# Patient Record
Sex: MALE | Race: BLACK OR AFRICAN AMERICAN | NOT HISPANIC OR LATINO | Employment: FULL TIME | URBAN - METROPOLITAN AREA
[De-identification: names, ages, dates, MRNs, and addresses within clinical notes are randomized per-mention and may not be internally consistent; named-entity substitution may affect disease eponyms.]

---

## 2018-04-28 ENCOUNTER — HOSPITAL ENCOUNTER (EMERGENCY)
Facility: HOSPITAL | Age: 38
Discharge: HOME | End: 2018-04-29
Attending: EMERGENCY MEDICINE

## 2018-04-28 DIAGNOSIS — R10.13 EPIGASTRIC PAIN: Primary | ICD-10-CM

## 2018-04-28 PROCEDURE — 99283 EMERGENCY DEPT VISIT LOW MDM: CPT

## 2018-04-29 VITALS
TEMPERATURE: 98 F | OXYGEN SATURATION: 96 % | SYSTOLIC BLOOD PRESSURE: 123 MMHG | WEIGHT: 220 LBS | BODY MASS INDEX: 32.58 KG/M2 | HEART RATE: 70 BPM | DIASTOLIC BLOOD PRESSURE: 80 MMHG | RESPIRATION RATE: 16 BRPM | HEIGHT: 69 IN

## 2018-04-29 PROCEDURE — 25000003 PHARM REV CODE 250: Performed by: EMERGENCY MEDICINE

## 2018-04-29 RX ORDER — ESOMEPRAZOLE MAGNESIUM 40 MG/1
40 CAPSULE, DELAYED RELEASE ORAL DAILY
Qty: 30 CAPSULE | Refills: 0 | Status: CANCELLED | OUTPATIENT
Start: 2018-04-29 | End: 2019-04-29

## 2018-04-29 RX ADMIN — LIDOCAINE HYDROCHLORIDE: 20 SOLUTION ORAL; TOPICAL at 12:04

## 2018-04-29 NOTE — ED PROVIDER NOTES
"Encounter Date: 4/28/2018       History     Chief Complaint   Patient presents with    Abdominal Pain     epigastric pain "a lot of discomfort" 7/10, unable to describe+ nausea and gas, pt reports recent hx of constipation this week and took a laxitive on Wednesday      Patient was emergently or with 3 days of epigastric burning.  Pain is intermittent and usually associated with food intake.  Patient denies fever chest pain diaphoresis or nausea.  Pain is moderate to severe and described as burning patient's reports some improvement when he takes Mylanta at home.          Review of patient's allergies indicates:  No Known Allergies  History reviewed. No pertinent past medical history.  History reviewed. No pertinent surgical history.  History reviewed. No pertinent family history.  Social History   Substance Use Topics    Smoking status: Never Smoker    Smokeless tobacco: Never Used    Alcohol use Yes     Review of Systems   Constitutional: Negative for fever.   HENT: Negative for sore throat.    Respiratory: Negative for cough, choking, chest tightness and shortness of breath.    Cardiovascular: Negative for chest pain and palpitations.   Gastrointestinal: Positive for abdominal pain. Negative for nausea and vomiting.   Genitourinary: Negative for dysuria.   Musculoskeletal: Negative for back pain.   Skin: Negative for rash.   Neurological: Negative for weakness.   Hematological: Does not bruise/bleed easily.   All other systems reviewed and are negative.      Physical Exam     Initial Vitals [04/28/18 2309]   BP Pulse Resp Temp SpO2   (!) 145/91 73 16 98.4 °F (36.9 °C) 99 %      MAP       109         Physical Exam    Nursing note and vitals reviewed.  Constitutional: Vital signs are normal. He appears well-developed and well-nourished. No distress.   HENT:   Head: Normocephalic and atraumatic.   Eyes: EOM are normal. Pupils are equal, round, and reactive to light.   Neck: Normal range of motion. Neck supple. "   Cardiovascular: Normal rate and regular rhythm.   Pulmonary/Chest: Breath sounds normal. No respiratory distress. He has no wheezes. He has no rhonchi. He has no rales. He exhibits no tenderness.   Abdominal: Soft. He exhibits no distension. There is no tenderness. There is no rebound and no guarding.   No rebound or guarding all 4 quadrants.   Musculoskeletal: Normal range of motion. He exhibits no tenderness.   Neurological: He is alert and oriented to person, place, and time. No cranial nerve deficit.   Skin: Skin is warm and dry.   Psychiatric: He has a normal mood and affect.         ED Course   Procedures  Labs Reviewed - No data to display                       Attending Attestation:             Attending ED Notes:   Pain completely resolved after GI cocktail.             Clinical Impression:   The encounter diagnosis was Epigastric pain.    Disposition:   Disposition: Discharged  Condition: Stable                        Fred Blake MD  04/29/18 0044       Fred Blake MD  04/29/18 0044

## 2019-02-16 ENCOUNTER — HOSPITAL ENCOUNTER (EMERGENCY)
Facility: HOSPITAL | Age: 39
Discharge: HOME | End: 2019-02-16
Attending: FAMILY MEDICINE
Payer: COMMERCIAL

## 2019-02-16 VITALS
DIASTOLIC BLOOD PRESSURE: 70 MMHG | OXYGEN SATURATION: 99 % | SYSTOLIC BLOOD PRESSURE: 120 MMHG | RESPIRATION RATE: 17 BRPM | HEART RATE: 76 BPM | TEMPERATURE: 99 F

## 2019-02-16 DIAGNOSIS — B34.9 ACUTE VIRAL SYNDROME: Primary | ICD-10-CM

## 2019-02-16 LAB
ALBUMIN SERPL BCP-MCNC: 4.2 G/DL
ALP SERPL-CCNC: 68 U/L
ALT SERPL W/O P-5'-P-CCNC: 17 U/L
ANION GAP SERPL CALC-SCNC: 7 MMOL/L
AST SERPL-CCNC: 43 U/L
BASOPHILS # BLD AUTO: 0.01 K/UL
BASOPHILS NFR BLD: 0.2 %
BILIRUB SERPL-MCNC: 1.5 MG/DL
BUN SERPL-MCNC: 11 MG/DL
CALCIUM SERPL-MCNC: 8.8 MG/DL
CHLORIDE SERPL-SCNC: 105 MMOL/L
CO2 SERPL-SCNC: 27 MMOL/L
CREAT SERPL-MCNC: 1 MG/DL
DIFFERENTIAL METHOD: ABNORMAL
EOSINOPHIL # BLD AUTO: 0 K/UL
EOSINOPHIL NFR BLD: 0.6 %
ERYTHROCYTE [DISTWIDTH] IN BLOOD BY AUTOMATED COUNT: 14 %
EST. GFR  (AFRICAN AMERICAN): >60 ML/MIN/1.73 M^2
EST. GFR  (NON AFRICAN AMERICAN): >60 ML/MIN/1.73 M^2
GLUCOSE SERPL-MCNC: 98 MG/DL
HCT VFR BLD AUTO: 42.7 %
HGB BLD-MCNC: 13.8 G/DL
INFLUENZA A, MOLECULAR: NEGATIVE
INFLUENZA B, MOLECULAR: NEGATIVE
LYMPHOCYTES # BLD AUTO: 0.4 K/UL
LYMPHOCYTES NFR BLD: 8.2 %
MCH RBC QN AUTO: 28.3 PG
MCHC RBC AUTO-ENTMCNC: 32.3 G/DL
MCV RBC AUTO: 88 FL
MONOCYTES # BLD AUTO: 0.4 K/UL
MONOCYTES NFR BLD: 8.4 %
NEUTROPHILS # BLD AUTO: 4.3 K/UL
NEUTROPHILS NFR BLD: 82.6 %
PLATELET # BLD AUTO: 246 K/UL
PMV BLD AUTO: 9.4 FL
POTASSIUM SERPL-SCNC: 4.2 MMOL/L
PROT SERPL-MCNC: 7.3 G/DL
RBC # BLD AUTO: 4.87 M/UL
SODIUM SERPL-SCNC: 139 MMOL/L
SPECIMEN SOURCE: NORMAL
WBC # BLD AUTO: 5.24 K/UL

## 2019-02-16 PROCEDURE — 96360 HYDRATION IV INFUSION INIT: CPT | Mod: ER

## 2019-02-16 PROCEDURE — 80053 COMPREHEN METABOLIC PANEL: CPT | Mod: ER

## 2019-02-16 PROCEDURE — 87502 INFLUENZA DNA AMP PROBE: CPT | Mod: ER

## 2019-02-16 PROCEDURE — 25000003 PHARM REV CODE 250: Mod: ER | Performed by: PHYSICIAN ASSISTANT

## 2019-02-16 PROCEDURE — 85025 COMPLETE CBC W/AUTO DIFF WBC: CPT | Mod: ER

## 2019-02-16 PROCEDURE — 99283 EMERGENCY DEPT VISIT LOW MDM: CPT | Mod: 25,ER

## 2019-02-16 RX ORDER — DICYCLOMINE HYDROCHLORIDE 20 MG/1
20 TABLET ORAL 3 TIMES DAILY PRN
Qty: 21 TABLET | Refills: 0 | Status: SHIPPED | OUTPATIENT
Start: 2019-02-16 | End: 2019-03-12

## 2019-02-16 RX ORDER — DICYCLOMINE HYDROCHLORIDE 10 MG/1
20 CAPSULE ORAL
Status: COMPLETED | OUTPATIENT
Start: 2019-02-16 | End: 2019-02-16

## 2019-02-16 RX ADMIN — DICYCLOMINE HYDROCHLORIDE 20 MG: 10 CAPSULE ORAL at 12:02

## 2019-02-16 RX ADMIN — SODIUM CHLORIDE 1000 ML: 0.9 INJECTION, SOLUTION INTRAVENOUS at 12:02

## 2019-02-16 NOTE — ED PROVIDER NOTES
Encounter Date: 2/16/2019       History     Chief Complaint   Patient presents with    Diarrhea     Diarrhea since yesterday afternoon     Patient is a 38-year-old male presenting with complaint of many episodes of diarrhea since yesterday.  He is feeling generally weak now.  No melena or hematochezia.  No recent antibiotics, travel or known exposure to a diarrhea illness.  His son is sick with the flu right now.  He denies any cough, congestion, fever or chills.  No abdominal pain.          Review of patient's allergies indicates:  No Known Allergies  History reviewed. No pertinent past medical history.  Past Surgical History:   Procedure Laterality Date    CLEFT PALATE REPAIR       History reviewed. No pertinent family history.  Social History     Tobacco Use    Smoking status: Never Smoker    Smokeless tobacco: Never Used   Substance Use Topics    Alcohol use: Yes    Drug use: Yes     Review of Systems   Constitutional: Positive for appetite change and fatigue. Negative for activity change, chills and fever.   HENT: Negative for congestion, ear pain and sore throat.    Respiratory: Negative for cough, shortness of breath and wheezing.    Cardiovascular: Negative for chest pain, palpitations and leg swelling.   Gastrointestinal: Positive for diarrhea. Negative for abdominal pain, blood in stool, nausea and vomiting.   Genitourinary: Positive for decreased urine volume. Negative for dysuria, flank pain and hematuria.   Musculoskeletal: Negative for back pain, myalgias, neck pain and neck stiffness.   Skin: Negative for rash.   Neurological: Negative for dizziness and headaches.   All other systems reviewed and are negative.      Physical Exam     Initial Vitals [02/16/19 1157]   BP Pulse Resp Temp SpO2   121/77 80 15 98.9 °F (37.2 °C) 100 %      MAP       --         Physical Exam    Nursing note and vitals reviewed.  Constitutional: He appears well-developed and well-nourished. He appears distressed  (Malaise).   HENT:   Head: Normocephalic and atraumatic.   Nose: Nose normal.   Mouth/Throat: Oropharynx is clear and moist.   Eyes: Conjunctivae and EOM are normal. Pupils are equal, round, and reactive to light.   Neck: Normal range of motion. Neck supple.   Cardiovascular: Normal rate, regular rhythm, normal heart sounds and intact distal pulses.   Pulmonary/Chest: Breath sounds normal. No respiratory distress. He has no wheezes. He has no rhonchi. He has no rales.   Abdominal: Soft. Bowel sounds are normal. There is no tenderness. There is no rebound and no guarding.   No tenderness over McBurney's point   Musculoskeletal: He exhibits no edema.   Lymphadenopathy:     He has no cervical adenopathy.   Neurological: He is alert and oriented to person, place, and time.   Skin: Skin is warm and dry. No rash noted.   Psychiatric: He has a normal mood and affect. His behavior is normal. Judgment and thought content normal.         ED Course   Procedures  Labs Reviewed   INFLUENZA A & B BY MOLECULAR   CBC W/ AUTO DIFFERENTIAL   COMPREHENSIVE METABOLIC PANEL   URINALYSIS, REFLEX TO URINE CULTURE          Imaging Results    None          Medical Decision Making:   Clinical Tests:   Lab Tests: Ordered and Reviewed  The following lab test(s) were unremarkable: CBC and CMP       <> Summary of Lab: Influenza negative.   Patient was feeling better, able to urinate and tolerating p.o. prior to discharge. No evidence of acute surgical abdomen. Advised him on supportive care.  Prescription for Bentyl.  Follow-up with PCP.  Return to the ED if worse in any way                      Clinical Impression:   The encounter diagnosis was Acute viral syndrome.      Disposition:   Disposition: Discharged                        LORAINE Leyva  02/16/19 2514

## 2019-03-12 ENCOUNTER — HOSPITAL ENCOUNTER (EMERGENCY)
Facility: HOSPITAL | Age: 39
Discharge: HOME | End: 2019-03-12
Attending: EMERGENCY MEDICINE
Payer: COMMERCIAL

## 2019-03-12 VITALS
RESPIRATION RATE: 18 BRPM | BODY MASS INDEX: 31.5 KG/M2 | HEART RATE: 77 BPM | SYSTOLIC BLOOD PRESSURE: 128 MMHG | OXYGEN SATURATION: 98 % | DIASTOLIC BLOOD PRESSURE: 89 MMHG | HEIGHT: 70 IN | TEMPERATURE: 98 F | WEIGHT: 220 LBS

## 2019-03-12 DIAGNOSIS — S16.1XXA NECK STRAIN, INITIAL ENCOUNTER: Primary | ICD-10-CM

## 2019-03-12 DIAGNOSIS — S39.012A BACK STRAIN, INITIAL ENCOUNTER: ICD-10-CM

## 2019-03-12 PROCEDURE — 99284 EMERGENCY DEPT VISIT MOD MDM: CPT | Mod: ER

## 2019-03-12 PROCEDURE — 25000003 PHARM REV CODE 250: Mod: ER | Performed by: PHYSICIAN ASSISTANT

## 2019-03-12 RX ORDER — CYCLOBENZAPRINE HCL 10 MG
10 TABLET ORAL 3 TIMES DAILY PRN
Qty: 15 TABLET | Refills: 0 | Status: SHIPPED | OUTPATIENT
Start: 2019-03-12 | End: 2019-03-17

## 2019-03-12 RX ORDER — KETOROLAC TROMETHAMINE 10 MG/1
10 TABLET, FILM COATED ORAL
Status: COMPLETED | OUTPATIENT
Start: 2019-03-12 | End: 2019-03-12

## 2019-03-12 RX ORDER — KETOROLAC TROMETHAMINE 10 MG/1
10 TABLET, FILM COATED ORAL EVERY 6 HOURS
Qty: 10 TABLET | Refills: 0 | Status: SHIPPED | OUTPATIENT
Start: 2019-03-12

## 2019-03-12 RX ADMIN — KETOROLAC TROMETHAMINE 10 MG: 10 TABLET, FILM COATED ORAL at 09:03
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
## 2019-03-13 NOTE — DISCHARGE INSTRUCTIONS
You are advised to follow up with your primary care provider for re-evaluation within 3 days.  You are instructed to return to the emergency department immediately for any new or worsening symptoms.

## 2020-07-17 ENCOUNTER — OCCUPATIONAL HEALTH (OUTPATIENT)
Dept: URGENT CARE | Facility: CLINIC | Age: 40
End: 2020-07-17

## 2020-07-17 DIAGNOSIS — Z02.83 ENCOUNTER FOR DRUG SCREENING: Primary | ICD-10-CM

## 2020-07-17 LAB
CTP QC/QA: YES
POC 5 PANEL DRUG SCREEN: NEGATIVE

## 2020-07-17 PROCEDURE — 80305 DRUG TEST PRSMV DIR OPT OBS: CPT | Mod: QW,S$GLB,, | Performed by: PHYSICIAN ASSISTANT

## 2020-07-17 PROCEDURE — 86580 POCT TB SKIN TEST: ICD-10-PCS | Mod: S$GLB,,, | Performed by: PHYSICIAN ASSISTANT

## 2020-07-17 PROCEDURE — 86580 TB INTRADERMAL TEST: CPT | Mod: S$GLB,,, | Performed by: PHYSICIAN ASSISTANT

## 2020-07-17 PROCEDURE — 80305 POCT RAPID DRUG SCREEN 5 PANEL: ICD-10-PCS | Mod: QW,S$GLB,, | Performed by: PHYSICIAN ASSISTANT

## 2020-07-20 LAB
TB INDURATION - 48 HR READ: NORMAL
TB INDURATION - 72 HR READ: 0 MM
TB SKIN TEST - 48 HR READ: NORMAL
TB SKIN TEST - 72 HR READ: NEGATIVE

## 2025-02-03 ENCOUNTER — HOSPITAL ENCOUNTER (EMERGENCY)
Facility: HOSPITAL | Age: 45
Discharge: HOME | End: 2025-02-03
Attending: EMERGENCY MEDICINE
Payer: MEDICAID

## 2025-02-03 VITALS
TEMPERATURE: 99 F | RESPIRATION RATE: 18 BRPM | BODY MASS INDEX: 31.07 KG/M2 | HEART RATE: 82 BPM | DIASTOLIC BLOOD PRESSURE: 97 MMHG | OXYGEN SATURATION: 99 % | WEIGHT: 217 LBS | HEIGHT: 70 IN | SYSTOLIC BLOOD PRESSURE: 158 MMHG

## 2025-02-03 DIAGNOSIS — S93.401A SPRAIN OF RIGHT ANKLE, UNSPECIFIED LIGAMENT, INITIAL ENCOUNTER: Primary | ICD-10-CM

## 2025-02-03 DIAGNOSIS — S99.919A ANKLE INJURY, INITIAL ENCOUNTER: ICD-10-CM

## 2025-02-03 PROCEDURE — 99283 EMERGENCY DEPT VISIT LOW MDM: CPT | Mod: 25,ER

## 2025-02-03 RX ORDER — KETOROLAC TROMETHAMINE 10 MG/1
10 TABLET, FILM COATED ORAL EVERY 6 HOURS
Qty: 20 TABLET | Refills: 0 | Status: SHIPPED | OUTPATIENT
Start: 2025-02-03 | End: 2025-02-08

## 2025-02-03 RX ORDER — DEXTROMETHORPHAN HYDROBROMIDE, GUAIFENESIN 5; 100 MG/5ML; MG/5ML
650 LIQUID ORAL EVERY 8 HOURS
Qty: 21 TABLET | Refills: 0 | Status: SHIPPED | OUTPATIENT
Start: 2025-02-03 | End: 2025-02-10

## 2025-02-03 RX ORDER — KETOROLAC TROMETHAMINE 30 MG/ML
30 INJECTION, SOLUTION INTRAMUSCULAR; INTRAVENOUS
Status: DISCONTINUED | OUTPATIENT
Start: 2025-02-03 | End: 2025-02-03 | Stop reason: HOSPADM

## 2025-02-03 NOTE — Clinical Note
"Madhu"Everette Metz was seen and treated in our emergency department on 2/3/2025.  He may return to work on 02/06/2025.       If you have any questions or concerns, please don't hesitate to call.      Carmita Otto PA-C"

## 2025-02-03 NOTE — ED PROVIDER NOTES
"Encounter Date: 2/3/2025       History     Chief Complaint   Patient presents with    Ankle Pain     Co right ankle pain after twisting it on Saturday night.     Patient is a 44-year-old male with no significant past medical history who presents to emergency room for right ankle pain after twisting it 2-3 days ago.  Patient states that he was outside playing with his kids when he stepped in a "hole in the ground," twisting his ankle.  Since then he has had pain and swelling to lateral aspect of ankle.  Worse with certain movements and bearing weight.  No weakness, numbness, or tingling.  No treatment attempted prior to arrival.    The history is provided by the patient. No  was used.     Review of patient's allergies indicates:  No Known Allergies  History reviewed. No pertinent past medical history.  Past Surgical History:   Procedure Laterality Date    CLEFT PALATE REPAIR       No family history on file.  Social History     Tobacco Use    Smoking status: Never    Smokeless tobacco: Never   Substance Use Topics    Alcohol use: Yes    Drug use: Yes     Review of Systems   Constitutional:  Negative for chills, diaphoresis, fatigue and fever.   Musculoskeletal:  Positive for arthralgias (right ankle). Negative for joint swelling and myalgias.   Skin:  Negative for color change, rash and wound.   Neurological:  Negative for weakness and numbness.       Physical Exam     Initial Vitals [02/03/25 1157]   BP Pulse Resp Temp SpO2   (!) 158/97 82 18 98.5 °F (36.9 °C) 99 %      MAP       --         Physical Exam    Nursing note and vitals reviewed.  Constitutional: He appears well-developed and well-nourished. He is not diaphoretic. No distress.   Patient well-appearing.  Awake and alert.  No acute distress.  Maintaining airway appropriately.  Speaking in complete sentences.   HENT:   Head: Normocephalic and atraumatic.   Right Ear: External ear normal.   Left Ear: External ear normal.   Eyes: " Conjunctivae and EOM are normal.   Neck: Neck supple.   Normal range of motion.  Pulmonary/Chest: No respiratory distress.   Musculoskeletal:         General: No edema.      Cervical back: Normal range of motion and neck supple.      Right ankle: Swelling present. Tenderness present. Decreased range of motion.      Left ankle: Normal.        Feet:      Neurological: He is alert and oriented to person, place, and time. He has normal strength.   Skin: Skin is warm. Capillary refill takes less than 2 seconds.   Psychiatric: He has a normal mood and affect. His behavior is normal. Thought content normal.         ED Course   Procedures  Labs Reviewed - No data to display       Imaging Results              X-Ray Ankle Complete Right (Final result)  Result time 02/03/25 12:39:26      Final result by Everton Harvey MD (02/03/25 12:39:26)                   Impression:      No acute fracture or dislocation.      Electronically signed by: Everton Harvey MD  Date:    02/03/2025  Time:    12:39               Narrative:    EXAMINATION:  XR ANKLE COMPLETE 3 VIEW RIGHT    CLINICAL HISTORY:  XR ANKLE COMPLETE 3 VIEW RIGHTUnspecified injury of unspecified ankle, initial encounter    COMPARISON:  None    FINDINGS:  Three views of the right ankle were obtained.    No evidence of acute fracture or dislocation.  Bony mineralization is normal.  Moderate lateral ankle soft tissue swelling.                                       Medications   ketorolac injection 30 mg (30 mg Intramuscular Not Given 2/3/25 1215)     Medical Decision Making  Patient presents to emergency room for right ankle pain.  Vital signs stable.  Physical exam as stated above.    Differential Diagnosis includes, but is not limited to fracture, dislocation, nerve injury/palsy, vascular injury, DVT, septic joint, cellulitis, bursitis, muscle strain, ligament tear/sprain, laceration, foreign body, abrasion, soft tissue contusion, osteoarthritis, or gout.  I do not suspect  nerve or vascular injury, as sensation and pulses intact.  No significant extremity edema that would suggest DVT.  Patient with adequate range of motion.  Unlikely septic joint.  No evidence of laceration or abrasion on physical exam.  X-ray without fracture or dislocation. Clinical presentation and physical exam most suggestive of ankle sprain.  Patient placed in walking boot in the emergency room.  Will prescribe Toradol and Tylenol to use upon discharge.  Discussed conservative management such as RICE therapy in addition to stretching.  Advised on over-the-counter analgesic medications such as lidocaine patches, ibuprofen, Tylenol, and icy hot.  Referral placed to orthopedics.    I see no indication of an emergent process beyond that addressed during our encounter. Patient stable for discharge at this time. I have counseled the patient regarding follow up with PCP and gave strict return precautions. I have discussed the final diagnosis and gave instructions regarding prescribed and over-the-counter medications. Patient verbalized understanding and is agreeable.     Problems Addressed:  Ankle injury, initial encounter: acute illness or injury  Sprain of right ankle, unspecified ligament, initial encounter: acute illness or injury    Amount and/or Complexity of Data Reviewed  External Data Reviewed: notes.     Details: Patient last seen by sleep medicine in 03/2024.  Radiology: ordered. Decision-making details documented in ED Course.    Risk  OTC drugs.  Prescription drug management.  Risk Details: Comorbidities taken into consideration during the patient's evaluation and treatment include none.    Social determinants of health taken into consideration during development of our treatment plan include difficulty in obtaining follow-up, obtaining medications, health literacy, access to healthy options for preventative/conservative management, and/or support systems due to, but not limited to, transportation  limitations, socioeconomic status, and environmental factors.                ED Course as of 02/03/25 1327   Mon Feb 03, 2025   1246 X-Ray Ankle Complete Right  Independently reviewed and agree with radiology reading:  No fracture or dislocation. [BJ]      ED Course User Index  [BJ] Carmita Otto PA-C                           Clinical Impression:  Final diagnoses:  [S99.919A] Ankle injury, initial encounter  [S93.401A] Sprain of right ankle, unspecified ligament, initial encounter (Primary)          ED Disposition Condition    Discharge Stable          ED Prescriptions       Medication Sig Dispense Start Date End Date Auth. Provider    ketorolac (TORADOL) 10 mg tablet Take 1 tablet (10 mg total) by mouth every 6 (six) hours. for 5 days 20 tablet 2/3/2025 2/8/2025 Carmita Otto PA-C    acetaminophen (TYLENOL) 650 MG TbSR Take 1 tablet (650 mg total) by mouth every 8 (eight) hours. for 7 days 21 tablet 2/3/2025 2/10/2025 Carmita Otto PA-C          Follow-up Information       Follow up With Specialties Details Why Contact Info Additional Information    Your doctor  Schedule an appointment as soon as possible for a visit        [facility] - Emergency Dept Emergency Medicine Go to  If new or worsening symptoms occur [address] [facility]  Emergency Department  [facility] [address] [phone]     [facility]   [address]  [facility] [address] [phone] Please park in [address] or [address] and use Michelle [address]win. Take Medical Office Bldg. elevators.            This note was partially created using [employer] Voice Recognition software. Typographical and content errors may occur with this process. While efforts are made to detect and correct such errors, in some cases errors will persist. For this reason, wording in this document should be considered in the proper context and not strictly verbatim.        Carmita Otto PA-C  02/03/25 1320

## 2025-02-03 NOTE — DISCHARGE INSTRUCTIONS
You were seen in the emergency room today for ankle pain.  Your imaging today showed that this was not due to a fracture, or broken bone.  Instead, it is likely that you have a sprain.  This means that you stretch or tore a ligament in the area.  Sprains may take from several weeks to several months to heal. Usually, the more pain and swelling you have, the more severe your sprain is and the longer it will take to heal. You can heal faster and regain strength with good home treatment.  It is very important to give your joint time to heal completely, so that you do not easily hurt it again.    Over the next few days, please followed these home recommendations to help with healing:  Prop up your extremity on pillows as much as possible for the next 3 days. Try to keep it above the level of your heart. This will help reduce the swelling.  Wrapping the area may help reduce or prevent swelling- you may use an Ace wrap or over-the-counter brace.  Put ice or cold packs on the injured area for 10 to 20 minutes at a time. Try to do this every 1 to 2 hours for the next 3 days (when you are awake) or until the swelling goes down. Put a thin cloth between the ice and your skin.  You may need to use crutches until you can walk without pain. If you do use crutches, try to bear some weight on your injured ankle if you can do so without pain. This helps the ankle heal.  Over the next few days you may rotate ibuprofen 800 mg and Tylenol 500-650mg every 3-4 hours.  This will help with inflammation.  Please follow up with Orthopedics within the next week for additional workup and management.